# Patient Record
Sex: FEMALE
[De-identification: names, ages, dates, MRNs, and addresses within clinical notes are randomized per-mention and may not be internally consistent; named-entity substitution may affect disease eponyms.]

---
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
## 2024-07-29 ENCOUNTER — APPOINTMENT (OUTPATIENT)
Dept: PRIMARY CARE | Facility: CLINIC | Age: 24
End: 2024-07-29
Payer: COMMERCIAL

## 2024-07-29 VITALS
HEIGHT: 68 IN | BODY MASS INDEX: 19.7 KG/M2 | WEIGHT: 130 LBS | HEART RATE: 74 BPM | DIASTOLIC BLOOD PRESSURE: 80 MMHG | SYSTOLIC BLOOD PRESSURE: 116 MMHG

## 2024-07-29 DIAGNOSIS — Z00.00 ANNUAL PHYSICAL EXAM: Primary | ICD-10-CM

## 2024-07-29 PROBLEM — L72.0 EPIDERMAL INCLUSION CYST: Status: RESOLVED | Noted: 2021-10-08 | Resolved: 2024-07-29

## 2024-07-29 PROBLEM — F42.2 MIXED OBSESSIONAL THOUGHTS AND ACTS: Status: ACTIVE | Noted: 2023-03-24

## 2024-07-29 PROBLEM — F45.21 ILLNESS ANXIETY DISORDER: Status: ACTIVE | Noted: 2022-11-01

## 2024-07-29 PROBLEM — N92.6 ABNORMAL MENSTRUAL PERIODS: Status: ACTIVE | Noted: 2024-07-29

## 2024-07-29 PROBLEM — J02.9 PHARYNGITIS: Status: ACTIVE | Noted: 2022-08-16

## 2024-07-29 PROBLEM — R50.9 FEVER: Status: RESOLVED | Noted: 2022-08-11 | Resolved: 2024-07-29

## 2024-07-29 PROBLEM — F32.5: Status: ACTIVE | Noted: 2018-02-15

## 2024-07-29 PROBLEM — F64.9 GENDER INCONGRUENCE: Status: ACTIVE | Noted: 2023-03-03

## 2024-07-29 PROBLEM — F41.1 GENERALIZED ANXIETY DISORDER: Status: ACTIVE | Noted: 2018-02-15

## 2024-07-29 PROBLEM — R10.32 LEFT LOWER QUADRANT ABDOMINAL PAIN: Status: RESOLVED | Noted: 2022-01-26 | Resolved: 2024-07-29

## 2024-07-29 PROBLEM — N92.1 METRORRHAGIA: Status: ACTIVE | Noted: 2018-02-15

## 2024-07-29 PROBLEM — R51.9 PERSISTENT HEADACHES: Status: ACTIVE | Noted: 2022-08-11

## 2024-07-29 PROCEDURE — 99395 PREV VISIT EST AGE 18-39: CPT | Performed by: INTERNAL MEDICINE

## 2024-07-29 PROCEDURE — 3008F BODY MASS INDEX DOCD: CPT | Performed by: INTERNAL MEDICINE

## 2024-07-29 RX ORDER — NORGESTIMATE AND ETHINYL ESTRADIOL 7DAYSX3 28
1 KIT ORAL DAILY
COMMUNITY

## 2024-07-29 RX ORDER — CLONAZEPAM 0.5 MG/1
0.5 TABLET ORAL 2 TIMES DAILY
COMMUNITY

## 2024-07-29 NOTE — PROGRESS NOTES
"Subjective   Patient ID: Ziggy Tapia is a 23 y.o. female who presents for Annual Exam.    HPI   Ziggy is a 23-year-old female who comes to establish primary care and she is due for an annual wellness exam and lab work.  Patient takes oral contraception but is not on any other regular prescription medications.  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  She is up-to-date on immunizations and will be referred to gynecology for Pap/pelvic.  Patient denies fever, chills, chest pain, shortness of breath, cough, dizziness, palpitations, syncope, abdominal pain, nausea, vomiting, diarrhea, melena, rectal bleeding, dysuria, hematuria or menstrual irregularity.  She complains of some sensitivity on and off in her right breast which she is concerned about.    Review of Systems  As per [facility].  Objective   /80 (BP Location: Right arm, Patient Position: Sitting, BP Cuff Size: Large adult)   Pulse 74   Ht 1.727 m (5' 8\")   Wt 59 kg (130 lb)   BMI 19.77 kg/m²     Physical Exam  General - well developed, well appearing,  in no acute respiratory distress  Eyes - normal conjunctiva with no pallor or icterus, normal extraocular movements  ENT - normal external auditory canals and tympanic membranes, throat clear with no exudates  Neck - No JVD, thyromegaly or lymphadenopathy  Lungs - no respiratory distress and lungs clear to auscultation bilaterally with no rales or wheezes  Heart - normal S1, S2 with normal heart rate, rhythm and no murmurs   Breasts -nipple piercings noted, no axillary adenopathy or masses felt in either breast  Pelvic and pap - per gyn  Abdomen -  soft, nontender with no masses or organomegaly  Extremities - no cyanosis or pedal edema  Neuro - grossly normal neuro exam with no focal neuro deficits  Psych - normal mental status, mood and affect   Skin - no rashes or ulcers  MSK - normal gait with grossly " normal ROM of major joints  Assessment/Plan        Annual wellness exam-patient is up-to-date on vaccines, routine labs will be ordered, she will be referred to gynecology regarding Pap/pelvic  Follow-up in 1 year or sooner if needed.  This note was partially generated using the Dragon voice recognition system. There may be some incorrect words, spelling and punctuation errors that were not corrected prior to committing the note to the patient's medical record.

## 2025-02-04 ENCOUNTER — APPOINTMENT (OUTPATIENT)
Dept: PRIMARY CARE | Facility: CLINIC | Age: 25
End: 2025-02-04
Payer: COMMERCIAL

## 2025-06-25 NOTE — PROGRESS NOTES
Assessment/Plan   Problem List Items Addressed This Visit    None  Visit Diagnoses         Codes      Well woman exam    -  Primary Z01.419      Screening for cervical cancer     Z12.4            Renetta Galloway, JOE-JASMEETM     Subjective   Ziggy Tapia is a 24 y.o. female who is here for a routine exam. Periods are regular every 28-30 days, lasting 4 days. Dysmenorrhea:mild, occurring first 1-2 days of flow. Cyclic symptoms include depression. No intermenstrual bleeding, spotting, or discharge.    Needs Klonopin refill for dentist  Needs rx for OCP    Needs pap    ROS      There were no vitals taken for this visit.    General:   Alert and oriented x 3   Heart:  Thyroid: Regular rate, rhythm  Euthyroid, normal shape and size   Lungs:  Breast: Clear to auscultation bilaterally  Symmetrical, no skin changes/nipple discharge, redness, tenderness, no masses palpated bilaterally   Abdomen: Soft, non tender   Vulva: EGBUS normal   Vagina: Pink, normal discharge   Cervix: No CMT   Uterus: Normal shape, size   Adnexa: NT bilaterally       Thank you for coming to see me for your visit today and most importantly thank you for having a preventative visit.  If lab work was sent, you will see your test result via youmag  Any prescriptions will be sent electronically to your pharmacy listed    I look forward to seeing you next year for your health care needs.  Please remember:   Sleep is important - make it a priority for at least 6 hours per night!   Exercise such as walking for 20 minutes per day is beneficial to your physical and mental health   Healthy diets can be expensive -- if you every feel like you are struggling to afford healthy food, please let me know as we have a very good program called [employer] that is available to you   Decrease alcohol and tobacco.  A goal of less than 7 alcoholic drinks per week is recommended for risk reduction of breast and colon cancer by 38%!    Be well!  Natividad

## 2025-06-26 ENCOUNTER — OFFICE VISIT (OUTPATIENT)
Facility: CLINIC | Age: 25
End: 2025-06-26
Payer: COMMERCIAL

## 2025-06-26 VITALS
SYSTOLIC BLOOD PRESSURE: 104 MMHG | HEIGHT: 67 IN | WEIGHT: 133.2 LBS | DIASTOLIC BLOOD PRESSURE: 78 MMHG | BODY MASS INDEX: 20.91 KG/M2

## 2025-06-26 DIAGNOSIS — Z30.09 BIRTH CONTROL COUNSELING: ICD-10-CM

## 2025-06-26 DIAGNOSIS — Z12.4 SCREENING FOR CERVICAL CANCER: ICD-10-CM

## 2025-06-26 DIAGNOSIS — F41.9 ANXIETY: ICD-10-CM

## 2025-06-26 DIAGNOSIS — Z01.419 WELL WOMAN EXAM: Primary | ICD-10-CM

## 2025-06-26 PROCEDURE — 3008F BODY MASS INDEX DOCD: CPT | Performed by: ADVANCED PRACTICE MIDWIFE

## 2025-06-26 PROCEDURE — 99385 PREV VISIT NEW AGE 18-39: CPT | Performed by: ADVANCED PRACTICE MIDWIFE

## 2025-06-26 RX ORDER — NORGESTIMATE AND ETHINYL ESTRADIOL 7DAYSX3 28
1 KIT ORAL DAILY
Qty: 90 TABLET | Refills: 4 | Status: SHIPPED | OUTPATIENT
Start: 2025-06-26 | End: 2025-09-24

## 2025-06-26 RX ORDER — CLONAZEPAM 0.5 MG/1
0.5 TABLET ORAL 2 TIMES DAILY
Qty: 4 TABLET | Refills: 0 | Status: SHIPPED | OUTPATIENT
Start: 2025-06-26 | End: 2025-06-28

## 2025-06-26 ASSESSMENT — PATIENT HEALTH QUESTIONNAIRE - PHQ9
2. FEELING DOWN, DEPRESSED OR HOPELESS: NOT AT ALL
SUM OF ALL RESPONSES TO PHQ9 QUESTIONS 1 AND 2: 0
1. LITTLE INTEREST OR PLEASURE IN DOING THINGS: NOT AT ALL

## 2025-06-26 ASSESSMENT — PAIN SCALES - GENERAL: PAINLEVEL_OUTOF10: 1

## 2025-06-26 ASSESSMENT — ENCOUNTER SYMPTOMS
DEPRESSION: 0
OCCASIONAL FEELINGS OF UNSTEADINESS: 0
LOSS OF SENSATION IN FEET: 0

## 2025-07-14 LAB
CYTOLOGY CMNT CVX/VAG CYTO-IMP: NORMAL
LAB AP HPV HR: NORMAL
LABORATORY COMMENT REPORT: NORMAL
PATH REPORT.TOTAL CANCER: NORMAL